# Patient Record
(demographics unavailable — no encounter records)

---

## 2024-12-08 NOTE — IMAGING
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

## 2024-12-08 NOTE — IMAGING
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

## 2024-12-08 NOTE — HISTORY OF PRESENT ILLNESS
[9] : 9 [7] : 7 [Dull/Aching] : dull/aching [Leisure] : leisure [Meds] : meds [Standing] : standing [Walking] : walking [de-identified] : 11/26/24: RHD 51 yo pt is here for pain in her Rt hand. Pt states that she has been feeling pain for about 2 weeks. States that her fingers are stiff and pain in her hand. Pain is worse in the morning. Denies numbness/ tingling. Denies trauma.  RHD PHMx: HTN, HLD [] : no [FreeTextEntry7] : Rt wrist [FreeTextEntry1] : Rt hand [FreeTextEntry9] : Ibuprofen

## 2024-12-08 NOTE — ASSESSMENT
[FreeTextEntry1] : The patient was advised of the diagnosis. The natural history of the pathology was explained in full to the patient in layman's terms. All questions were answered. The risks and benefits of surgical and non-surgical treatment alternatives were explained in full to the patient.  we reviewed the anatomy, pathology, and treatment options for TFCC tears. We discussed that most of the TFCC is avascular and tears are slow to heal if at all but that surgical results are not guaranteed due to the poor healing capacity of the structure.  Even at surgery, most tears cannot be repaired, but are merely debrided.  We discussed the use of nsaids, bracing, rest, local modalities, injection and surgery in the treatment of TFCC tears. At this point, the patient will proceed with non-operative treatment.  Naproxen 550mg rx  NSAIDs recommended.  Patient warned of risk of NSAID medication to stomach and GI tract, risk of increase blood pressure, cardiac risk, and risk of fluid retention.  The patient should clear taking medication with internist/PMD if any problem with heart, blood pressure, or GI system exists.  A brace was applied.  The importance of ice and elevation were discussed with the patient.  The risks were also discussed such as compartment syndrome and skin breakdown.  They were instructed to never put foreign objects down the brace.  Patients should call for increasing pain, worsening swelling, numbness, extremity discoloration, or any other concerns.

## 2024-12-08 NOTE — HISTORY OF PRESENT ILLNESS
[9] : 9 [7] : 7 [Dull/Aching] : dull/aching [Leisure] : leisure [Meds] : meds [Standing] : standing [Walking] : walking [de-identified] : 11/26/24: RHD 49 yo pt is here for pain in her Rt hand. Pt states that she has been feeling pain for about 2 weeks. States that her fingers are stiff and pain in her hand. Pain is worse in the morning. Denies numbness/ tingling. Denies trauma.  RHD PHMx: HTN, HLD [] : no [FreeTextEntry1] : Rt hand [FreeTextEntry7] : Rt wrist [FreeTextEntry9] : Ibuprofen

## 2024-12-17 NOTE — IMAGING
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

## 2024-12-17 NOTE — HISTORY OF PRESENT ILLNESS
[de-identified] : 12/17/24: persistent pain in hands/wrists persists- has a small mass volar surface of right wrist. also with pain in the right thumb and index fingers. naproxen not helping  11/26/24: RHD 51 yo pt is here for pain in her Rt hand. Pt states that she has been feeling pain for about 2 weeks. States that her fingers are stiff and pain in her hand. Pain is worse in the morning. Denies numbness/ tingling. Denies trauma.  RHD PHMx: HTN, HLD [9] : 9 [7] : 7 [Dull/Aching] : dull/aching [Leisure] : leisure [Meds] : meds [Standing] : standing [Walking] : walking [] : no [FreeTextEntry1] : Rt hand [FreeTextEntry7] : Rt wrist [FreeTextEntry9] : Ibuprofen

## 2024-12-17 NOTE — ASSESSMENT
[FreeTextEntry1] : medrol rx  Patient warned of risk of medication to stomach and GI tract, risk of increase blood pressure, cardiac risk, and risk of fluid retention.  The patient should clear taking medication with internist/PMD if any problem with heart, blood pressure, or GI system exists.  c/w brtaceThe patient was advised of the diagnosis. The natural history of the pathology was explained in full to the patient in layman's terms. All questions were answered. The risks and benefits of surgical and non-surgical treatment alternatives were explained in full to the patient.  we reviewed the anatomy, pathology, and treatment options for TFCC tears. We discussed that most of the TFCC is avascular and tears are slow to heal if at all but that surgical results are not guaranteed due to the poor healing capacity of the structure.  Even at surgery, most tears cannot be repaired, but are merely debrided.  We discussed the use of nsaids, bracing, rest, local modalities, injection and surgery in the treatment of TFCC tears. At this point, the patient will proceed with non-operative treatment.  recommend rheum consult  MRI to eval wrist

## 2025-01-07 NOTE — ASSESSMENT
[FreeTextEntry1] :  c/w brace The patient was advised of the diagnosis. The natural history of the pathology was explained in full to the patient in layman's terms. All questions were answered. The risks and benefits of surgical and non-surgical treatment alternatives were explained in full to the patient.  Right FCR CSI #1 provided today.   We reviewed the anatomy of the (FCR) flexor sheath and pathology of trigger fingers with the use of drawings and discussion.  We discussed the treatment options including splinting/nsaids, injection and surgery.  We discussed that too many injections may lead to weakening of the tendon/tendon rupture (pt informed that if FCR rupture occurs, no surgical intervention would be required). After a discussion of the risks, benefits and alternatives along with the expectations, the patient was amenable to injection.  The indication for injection is pain and inflammation.  The skin overlying the tendon flexor carpi radialis tendon site was prepared with alcohol and ethyl chloride was sprayed topically.  Sterile technique was used. An injection of 1ml of lidocaine and 6mg of betamethasone was used.  The patient was instructed to call if redness, pain or fever occur.  They may apply ice for 15 minutes every hour for the next 12-24 hours as tolerated.  The patient understands that it may take 2-5 days to see a noticeable difference.  Sterile Band-Aid was applied.  RTO in 3-4 weeks for f/u care. continue bracing for comfort.

## 2025-01-07 NOTE — HISTORY OF PRESENT ILLNESS
[9] : 9 [7] : 7 [Dull/Aching] : dull/aching [Leisure] : leisure [Meds] : meds [Standing] : standing [Walking] : walking [de-identified] : 01/07/2025: Patient is here for MRI results for her Rt hand. Pt has noted minimal pain relief s/p medrol dose pack and bracing.  MRI wrist 12/26/2025 Images reviewed by me today. My independent interpretation of this test diffuse carpal patchy marrow edema with mild to moderate wrist effusion (possible underlying inflammatory process). There is no acute fracture or ligament tear. mild FCR tendinitis was noted.    12/17/24: persistent pain in hands/wrists persists- has a small mass volar surface of right wrist. also with pain in the right thumb and index fingers. naproxen not helping  11/26/24: RHD 51 yo pt is here for pain in her Rt hand. Pt states that she has been feeling pain for about 2 weeks. States that her fingers are stiff and pain in her hand. Pain is worse in the morning. Denies numbness/ tingling. Denies trauma.  RHD  PHMx: HTN, HLD [] : no [FreeTextEntry1] : Rt hand [FreeTextEntry7] : Rt wrist [FreeTextEntry9] : Ibuprofen

## 2025-01-07 NOTE — IMAGING
[de-identified] : RT WRIST:  pain with wrist flexion: ROM is limited ttp over FCR dorsal SL TFC TTP all digits are nvi with FAROM painful but (-) Mitesh's

## 2025-02-04 NOTE — IMAGING
[de-identified] : RT WRIST:  pain with wrist flexion: ROM is limited ttp over FCR dorsal SL TFC TTP all digits are nvi with FAROM painful but (-) Mitesh's

## 2025-02-04 NOTE — ASSESSMENT
[FreeTextEntry1] : c/w brace The patient was advised of the diagnosis. The natural history of the pathology was explained in full to the patient in layman's terms. All questions were answered. The risks and benefits of surgical and non-surgical treatment alternatives were explained in full to the patient.  Recommend consult with Rheumatologist. Right wrist CSI #1 provided today.   Medium joint injection was performed of the left wrist. The indication for this procedure was pain and inflammation. The site was prepped with alcohol and ethyl chloride sprayed topically. An injection of Lidocaine 1cc of 1%  was used Betamethasone (Celestone) 1cc of 6mg.  Patient was advised to call if redness, pain or fever occur and apply ice for 15 minutes out of every hour for the next 12-24 hours as tolerated. The risks benefits, and alternatives have been discussed, and verbal consent was obtained  RTO 1 weeks continue bracing for comfort

## 2025-07-06 NOTE — REVIEW OF SYSTEMS
[Fatigue] : fatigue [Night Sweats] : night sweats [Recent Weight Gain (___ Lbs)] : recent [unfilled] ~Ulb weight gain [Frequency] : frequency [Arthralgias] : arthralgias [Joint Stiffness] : joint stiffness [Joint Swelling] : joint swelling [Feeling Weak] : feeling weak [Hot Flashes] : hot flashes [Muscle Weakness] : muscle weakness [Negative] : Heme/Lymph
